# Patient Record
Sex: FEMALE | Race: WHITE | Employment: STUDENT | URBAN - METROPOLITAN AREA
[De-identification: names, ages, dates, MRNs, and addresses within clinical notes are randomized per-mention and may not be internally consistent; named-entity substitution may affect disease eponyms.]

---

## 2018-05-10 ENCOUNTER — OFFICE VISIT (OUTPATIENT)
Dept: OPTOMETRY | Facility: CLINIC | Age: 23
End: 2018-05-10

## 2018-05-10 DIAGNOSIS — H52.223 REGULAR ASTIGMATISM OF BOTH EYES: ICD-10-CM

## 2018-05-10 DIAGNOSIS — H53.002 AMBLYOPIA OF LEFT EYE: Primary | ICD-10-CM

## 2018-05-10 PROCEDURE — 92012 INTRM OPH EXAM EST PATIENT: CPT | Performed by: OPTOMETRIST

## 2018-05-10 PROCEDURE — 92015 DETERMINE REFRACTIVE STATE: CPT | Performed by: OPTOMETRIST

## 2018-05-10 NOTE — PROGRESS NOTES
Ania Torres is a 23year old female. HPI:     HPI     Patient is in for an annual eye exam. Patient last had an EE in 2013 and new RX given. She has to hold her reading material too close with these glasses and has not worn lately.     Last edited by Pupils       Pupils    Right PERRL    Left PERRL          Visual Fields       Left Right     Full Full          Extraocular Movement       Right Left     Full, Ortho Full, Ortho          Neuro/Psych     Oriented x3:  Yes    Mood/Affect:  Normal

## 2018-05-10 NOTE — PATIENT INSTRUCTIONS
Regular astigmatism of both eyes  New glasses RX given to update as needed. Amblyopia  No treatment is required. Will continue to observe.

## 2018-07-05 ENCOUNTER — OFFICE VISIT (OUTPATIENT)
Dept: FAMILY MEDICINE CLINIC | Facility: CLINIC | Age: 23
End: 2018-07-05

## 2018-07-05 VITALS
HEART RATE: 78 BPM | OXYGEN SATURATION: 99 % | HEIGHT: 66 IN | SYSTOLIC BLOOD PRESSURE: 108 MMHG | BODY MASS INDEX: 20.73 KG/M2 | WEIGHT: 129 LBS | DIASTOLIC BLOOD PRESSURE: 68 MMHG

## 2018-07-05 DIAGNOSIS — Z91.09 ENVIRONMENTAL ALLERGIES: ICD-10-CM

## 2018-07-05 DIAGNOSIS — Z00.00 ROUTINE MEDICAL EXAM: Primary | ICD-10-CM

## 2018-07-05 DIAGNOSIS — L70.8 OTHER ACNE: ICD-10-CM

## 2018-07-05 PROCEDURE — 99395 PREV VISIT EST AGE 18-39: CPT | Performed by: FAMILY MEDICINE

## 2018-07-05 NOTE — PROGRESS NOTES
Komal De Guzman is a 23year old female. Patient presents with:  Establish Care: physical  no pap       HPI:     With mom today  Has seen holistic physician and chiropractor in the past.   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 Psychiatric/Behavioral: Negative for depression, memory loss, substance abuse and suicidal ideas. The patient is not nervous/anxious and does not have insomnia.         FAMILY HISTORY:      Family History   Problem Relation Age of Onset   • Diabetes Materna School:[employer]        Grade:12th        Sports:Tennis               SURGICAL HISTORY:     Past Surgical History:   Procedure Laterality Date   • Cyst removal  2014    tale bone    • Other surgical history  2014    cyst removed from [facility] No orders of the defined types were placed in this encounter. Patient Instructions     The products and items listed below (the “Products”)  and their claims have not been evaluated by the Food and Drug Administration.  Dietary products are not intende This is a carbon, soil-based product that helps to rebuild the tight junctions, or important connections between cells. These tight junctions get compromised by daily stress, reduce immune function and poor diet.  They are essential to the proper and health SOME DAIRY PRODUCTS  Butter  Ghee  Kefir  Yogurt (probiotic)  LOW-MOLD NUTS & SEEDS  Almonds  Coconut  Flax seed  Hazelnuts  Sunflower seeds  HERBS, SPICES, & CONDIMENTS  Apple cider vinegar  Basil  Black pepper  Cinnamon  Cloves  Coconut aminos  Twentynine palms ? Nuts and seeds (almonds, walnuts, pecans, macadamia nuts and pumpkin seeds)  ? Nut butter packets (almond, pecan, macadamia nuts—[employer] makes individual packs)  ? Coconut butter packets ([employer] brand is great)   ?  Whole food or raw food protein bars

## 2018-07-05 NOTE — PATIENT INSTRUCTIONS
The products and items listed below (the “Products”)  and their claims have not been evaluated by the Food and Drug Administration. Dietary products are not intended to treat, prevent, mitigate or cure disease.  Ultimately, you must draw your own conclusion This is a carbon, soil-based product that helps to rebuild the tight junctions, or important connections between cells. These tight junctions get compromised by daily stress, reduce immune function and poor diet.  They are essential to the proper and health SOME DAIRY PRODUCTS  Butter  Ghee  Kefir  Yogurt (probiotic)  LOW-MOLD NUTS & SEEDS  Almonds  Coconut  Flax seed  Hazelnuts  Sunflower seeds  HERBS, SPICES, & CONDIMENTS  Apple cider vinegar  Basil  Black pepper  Cinnamon  Cloves  Coconut aminos  Twentynine palms ? Nuts and seeds (almonds, walnuts, pecans, macadamia nuts and pumpkin seeds)  ? Nut butter packets (almond, pecan, macadamia nuts—[employer] makes individual packs)  ? Coconut butter packets ([employer] brand is great)   ?  Whole food or raw food protein bars

## 2018-08-02 ENCOUNTER — TELEPHONE (OUTPATIENT)
Dept: ORTHOPEDICS CLINIC | Facility: CLINIC | Age: 23
End: 2018-08-02

## 2018-08-02 NOTE — TELEPHONE ENCOUNTER
Pt has an appt with Dr Wong Notice on Aug 27th and goes back to school on Aug 21 so would like to be seen sooner if possible. Pt was rescheduled by phone room mistakenly.  Please advise

## 2018-08-06 ENCOUNTER — TELEPHONE (OUTPATIENT)
Dept: OPTOMETRY | Facility: CLINIC | Age: 23
End: 2018-08-06

## 2018-10-17 ENCOUNTER — TELEPHONE (OUTPATIENT)
Dept: OPTOMETRY | Facility: CLINIC | Age: 23
End: 2018-10-17

## 2018-10-17 NOTE — TELEPHONE ENCOUNTER
Patient would like to see if she can be seen on 10/19 for contact fitting states will be in town this weekend only. Please call. Thank you.

## 2018-10-18 NOTE — TELEPHONE ENCOUNTER
I left I message that I saw her phone call this morning requesting an appointment but I waited all day to see if there was a cancel for tomorrow but no cancels. LM I will call if any cxl's before I leave at 7.

## 2018-11-20 ENCOUNTER — OFFICE VISIT (OUTPATIENT)
Dept: OPTOMETRY | Facility: CLINIC | Age: 23
End: 2018-11-20
Payer: COMMERCIAL

## 2018-11-20 DIAGNOSIS — H52.223 REGULAR ASTIGMATISM OF BOTH EYES: Primary | ICD-10-CM

## 2018-11-20 PROCEDURE — 92012 INTRM OPH EXAM EST PATIENT: CPT | Performed by: OPTOMETRIST

## 2018-11-20 NOTE — PROGRESS NOTES
Don Leggett is a 26year old female. HPI:     HPI     Patient is in for a contact lens fitting. She tried lenses in 2011 but never was able to insert well so she stopped wear. Wants to try a toric again. She had a lens for the left eye only but she ha Left    Dist cc 20/20 20/20 -2    Correction:  Glasses          Neuro/Psych     Oriented x3:  Yes    Mood/Affect:  Normal            Additional Tests     Keratometry       K1 K2    Right 42.50 43.75    Left 41.75 43.00            Slit Lamp and Fundus Exam

## 2018-11-20 NOTE — PATIENT INSTRUCTIONS
Regular astigmatism of both eyes  Order trial pair of lenses and patient will call to schedule a one hour contact lens training.

## 2019-02-20 ENCOUNTER — TELEPHONE (OUTPATIENT)
Dept: OPHTHALMOLOGY | Facility: CLINIC | Age: 24
End: 2019-02-20

## 2019-02-20 NOTE — TELEPHONE ENCOUNTER
Mom wants to confirm that pts lenses will be in when she comes in for her 3/21/19 appt. for contact training.

## 2019-02-21 NOTE — TELEPHONE ENCOUNTER
Spoke with mother and let her know that Dr. Killian Zheng is not in today, but he will be back on 2/25/19, so he will call her then. I could not find any contacts for her in the back.

## 2019-03-21 ENCOUNTER — OFFICE VISIT (OUTPATIENT)
Dept: OPTOMETRY | Facility: CLINIC | Age: 24
End: 2019-03-21
Payer: COMMERCIAL

## 2019-03-21 DIAGNOSIS — H52.223 REGULAR ASTIGMATISM OF BOTH EYES: Primary | ICD-10-CM

## 2019-03-21 PROCEDURE — 92310 CONTACT LENS FITTING OU: CPT | Performed by: OPTOMETRIST

## 2019-03-21 NOTE — PATIENT INSTRUCTIONS
Regular astigmatism of both eyes  Patient was trained on insertion and removal, care and handling, cleaning and disinfection and wear schedule. Patient is adept at all. Return in 1-2 weeks for contact lens check.

## 2019-03-21 NOTE — PROGRESS NOTES
Karissa Kim is a 26year old female. HPI:     HPI     Patient is in for contact lens training. She wore contacts in the past but did not do well with I & R. She is here to try again.     Last edited by Kristy Alonzo OD on 3/21/2019 10:40 AM. (History) Cylinder Axis    Right [employer] Soflens Toric DW/EW 8.50 14.5 Cropsey -0.75 180    Left [employer] Soflens Toric DW/EW 8.50 14.5 +1.75 -0.75 180          Contact History     Replacement Frequency:  Every 2 weeks    Solutions Used:  OptiFree Pure

## 2019-12-26 ENCOUNTER — TELEPHONE (OUTPATIENT)
Dept: OPTOMETRY | Facility: CLINIC | Age: 24
End: 2019-12-26

## 2019-12-26 NOTE — TELEPHONE ENCOUNTER
Pt's mother called to request pt's contact lens rx. Changed to daily disposable. Caller aware Dr. Ryann Stockton is out of the office until 12-31-19.   Please call to advise

## 2020-12-30 ENCOUNTER — LAB ENCOUNTER (OUTPATIENT)
Dept: LAB | Facility: HOSPITAL | Age: 25
End: 2020-12-30
Attending: DERMATOLOGY
Payer: COMMERCIAL

## 2020-12-30 DIAGNOSIS — L70.0 ACNE VULGARIS: Primary | ICD-10-CM

## 2020-12-30 LAB — POTASSIUM SERPL-SCNC: 4 MMOL/L (ref 3.5–5.1)

## 2020-12-30 PROCEDURE — 84132 ASSAY OF SERUM POTASSIUM: CPT

## 2020-12-30 PROCEDURE — 36415 COLL VENOUS BLD VENIPUNCTURE: CPT

## 2023-08-06 NOTE — ASSESSMENT & PLAN NOTE
Patient was trained on insertion and removal, care and handling, cleaning and disinfection and wear schedule. Patient is adept at all. Return in 1-2 weeks for contact lens check.
none